# Patient Record
Sex: FEMALE | Race: WHITE | HISPANIC OR LATINO | Employment: UNEMPLOYED | URBAN - METROPOLITAN AREA
[De-identification: names, ages, dates, MRNs, and addresses within clinical notes are randomized per-mention and may not be internally consistent; named-entity substitution may affect disease eponyms.]

---

## 2023-08-01 ENCOUNTER — APPOINTMENT (OUTPATIENT)
Dept: ULTRASOUND IMAGING | Facility: HOSPITAL | Age: 28
End: 2023-08-01

## 2023-08-01 ENCOUNTER — HOSPITAL ENCOUNTER (EMERGENCY)
Facility: HOSPITAL | Age: 28
Discharge: HOME | End: 2023-08-01
Attending: EMERGENCY MEDICINE | Admitting: EMERGENCY MEDICINE

## 2023-08-01 VITALS
BODY MASS INDEX: 36.14 KG/M2 | WEIGHT: 204 LBS | SYSTOLIC BLOOD PRESSURE: 101 MMHG | RESPIRATION RATE: 18 BRPM | HEART RATE: 97 BPM | OXYGEN SATURATION: 97 % | TEMPERATURE: 98.7 F | HEIGHT: 63 IN | DIASTOLIC BLOOD PRESSURE: 66 MMHG

## 2023-08-01 DIAGNOSIS — O20.0 THREATENED ABORTION: Primary | ICD-10-CM

## 2023-08-01 LAB
ABO GROUP BLD: NORMAL
HCG INTACT+B SERPL-ACNC: 654.2 MIU/ML
NUMBER OF DOSES: NORMAL
RH BLD: POSITIVE

## 2023-08-01 PROCEDURE — 86901 BLOOD TYPING SEROLOGIC RH(D): CPT | Performed by: EMERGENCY MEDICINE

## 2023-08-01 PROCEDURE — 84702 CHORIONIC GONADOTROPIN TEST: CPT | Performed by: EMERGENCY MEDICINE

## 2023-08-01 PROCEDURE — 76817 TRANSVAGINAL US OBSTETRIC: CPT

## 2023-08-01 PROCEDURE — 93976 VASCULAR STUDY: CPT

## 2023-08-01 PROCEDURE — 36415 COLL VENOUS BLD VENIPUNCTURE: CPT

## 2023-08-01 PROCEDURE — 76801 OB US < 14 WKS SINGLE FETUS: CPT

## 2023-08-01 PROCEDURE — 86900 BLOOD TYPING SEROLOGIC ABO: CPT | Performed by: EMERGENCY MEDICINE

## 2023-08-01 PROCEDURE — 99284 EMERGENCY DEPT VISIT MOD MDM: CPT

## 2023-08-01 NOTE — ED PROVIDER NOTES
"Subjective   History of Present Illness  Chief complaint: Vaginal bleeding     27-year-old female presents with vaginal bleeding.  Patient states she is approximately 5 weeks pregnant.  She has taken two pregnancy tests over the past couple days that have been positive.  She states her last period was .  The bleeding started today.  She has had some lower abdominal cramping as well.  She is .  She denies any other complaints.      Review of Systems   Constitutional:  Negative for fever.   HENT:  Negative for congestion.    Respiratory:  Negative for cough and shortness of breath.    Cardiovascular:  Negative for chest pain.   Gastrointestinal:  Positive for abdominal pain. Negative for vomiting.   Genitourinary:  Positive for vaginal bleeding.   Musculoskeletal:  Negative for back pain.     No past medical history on file.    Allergies   Allergen Reactions    Shellfish-Derived Products Itching    Fish-Derived Products Itching     Hives        No past surgical history on file.    No family history on file.    Social History     Socioeconomic History    Marital status: Single       /66   Pulse 97   Temp 98.7 øF (37.1 øC)   Resp 18   Ht 160 cm (62.99\")   Wt 92.5 kg (204 lb)   LMP 2023   SpO2 97%   BMI 36.15 kg/mý       Objective   Physical Exam  Vitals and nursing note reviewed.   Constitutional:       Appearance: Normal appearance.   HENT:      Head: Normocephalic and atraumatic.      Mouth/Throat:      Mouth: Mucous membranes are moist.   Cardiovascular:      Rate and Rhythm: Normal rate and regular rhythm.   Pulmonary:      Effort: Pulmonary effort is normal. No respiratory distress.   Abdominal:      General: Bowel sounds are normal.      Palpations: Abdomen is soft.      Comments: Mild tenderness in lower abdomen, no rebound or guarding   Genitourinary:     Comments: External exam normal.  Speculum exam shows dark red blood in the vaginal vault with no tissue seen.  Bimanual exam " shows no cervical motion tenderness or adnexal tenderness.  Cervical os is closed.  Skin:     General: Skin is warm and dry.   Neurological:      Mental Status: She is alert and oriented to person, place, and time.       Procedures           ED Course      Results for orders placed or performed during the hospital encounter of 23   hCG, Quantitative, Pregnancy    Specimen: Blood   Result Value Ref Range    HCG Quantitative 654.20 mIU/mL    RhIg Evaluation    Specimen: Blood   Result Value Ref Range    ABO Type A     RH type Positive    Doses of Rh Immune Globulin    Specimen: Blood   Result Value Ref Range    Number of Doses       RhIg is not indicated due to the patient's Rh status     US Ob < 14 Weeks Single or First Gestation    Result Date: 2023  Impression: 1.Intrauterine pregnancy is not apparent. The hCG level is reported low which may relate to early pregnancy or could be secondary to interval miscarriage and should be correlated with patient history. The need for follow-up should be based on patient's clinical history and hCG level. 2.Minimal free fluid in the pelvis. Electronically Signed: Chito Savage  2023 6:27 PM EDT  Workstation ID: [id number]    US Ob Transvaginal    Result Date: 2023  Impression: 1.Intrauterine pregnancy is not apparent. The hCG level is reported low which may relate to early pregnancy or could be secondary to interval miscarriage and should be correlated with patient history. The need for follow-up should be based on patient's clinical history and hCG level. 2.Minimal free fluid in the pelvis. Electronically Signed: Chito Savage  2023 6:27 PM EDT  Workstation ID: [id number]                                        Medical Decision Making  Amount and/or Complexity of Data Reviewed  Labs: ordered.  Radiology: ordered.      Patient had the above evaluation.  Results were discussed with the patient.  hCG is fairly low at 654.  No intrauterine pregnancy was seen on  ultrasound.  I discussed with the patient that this could represent miscarriage versus just an early pregnancy that is too soon to show up on ultrasound.  Blood type is A+.  No need for RhoGAM.  Patient will be given OB/GYN follow-up.  Patient is agreeable with this plan.      Final diagnoses:   Threatened        ED Disposition  ED Disposition       ED Disposition   Discharge    Condition   Stable    Comment   --               Мария Johnson MD  [address] [place] [address] [place] [facility]  [phone]    Call in 1 day           Medication List      No changes were made to your prescriptions during this visit.            Rico Pierson MD  23 [id number]

## 2023-12-19 ENCOUNTER — TRANSCRIBE ORDERS (OUTPATIENT)
Dept: ADMINISTRATIVE | Facility: HOSPITAL | Age: 28
End: 2023-12-19

## 2023-12-19 DIAGNOSIS — Z00.00 ROUTINE GENERAL MEDICAL EXAMINATION AT A HEALTH CARE FACILITY: Primary | ICD-10-CM
